# Patient Record
Sex: FEMALE | Race: WHITE | NOT HISPANIC OR LATINO | Employment: STUDENT | URBAN - METROPOLITAN AREA
[De-identification: names, ages, dates, MRNs, and addresses within clinical notes are randomized per-mention and may not be internally consistent; named-entity substitution may affect disease eponyms.]

---

## 2023-01-22 ENCOUNTER — HOSPITAL ENCOUNTER (EMERGENCY)
Facility: HOSPITAL | Age: 18
Discharge: HOME | End: 2023-01-22
Attending: EMERGENCY MEDICINE

## 2023-01-22 ENCOUNTER — APPOINTMENT (EMERGENCY)
Dept: ULTRASOUND IMAGING | Facility: HOSPITAL | Age: 18
End: 2023-01-22

## 2023-01-22 VITALS
SYSTOLIC BLOOD PRESSURE: 118 MMHG | WEIGHT: 156.53 LBS | TEMPERATURE: 97.8 F | RESPIRATION RATE: 18 BRPM | HEIGHT: 68 IN | HEART RATE: 80 BPM | BODY MASS INDEX: 23.72 KG/M2 | OXYGEN SATURATION: 97 % | DIASTOLIC BLOOD PRESSURE: 69 MMHG

## 2023-01-22 DIAGNOSIS — N93.9 VAGINAL BLEEDING: Primary | ICD-10-CM

## 2023-01-22 DIAGNOSIS — O03.9 MISCARRIAGE: ICD-10-CM

## 2023-01-22 LAB
ABO GROUP BLD: NORMAL
ABO GROUP BLD: NORMAL
ALBUMIN SERPL BCP-MCNC: 4.5 G/DL (ref 3.5–5)
ALP SERPL-CCNC: 65 U/L (ref 34–104)
ALT SERPL W P-5'-P-CCNC: 9 U/L (ref 7–52)
ANION GAP SERPL CALCULATED.3IONS-SCNC: 8 MMOL/L (ref 4–13)
APTT PPP: 27 SECONDS (ref 23–37)
AST SERPL W P-5'-P-CCNC: 13 U/L (ref 13–39)
B-HCG SERPL-ACNC: 2134 MIU/ML (ref 0–11.6)
BACTERIA UR QL AUTO: ABNORMAL /HPF
BASOPHILS # BLD AUTO: 0.05 THOUSANDS/ÂΜL (ref 0–0.1)
BASOPHILS NFR BLD AUTO: 1 % (ref 0–1)
BILIRUB SERPL-MCNC: 0.25 MG/DL (ref 0.2–1)
BILIRUB UR QL STRIP: NEGATIVE
BLD GP AB SCN SERPL QL: NEGATIVE
BUN SERPL-MCNC: 10 MG/DL (ref 5–25)
CALCIUM SERPL-MCNC: 9.4 MG/DL (ref 8.4–10.2)
CHLORIDE SERPL-SCNC: 106 MMOL/L (ref 96–108)
CLARITY UR: ABNORMAL
CO2 SERPL-SCNC: 24 MMOL/L (ref 21–32)
COLOR UR: ABNORMAL
CREAT SERPL-MCNC: 0.65 MG/DL (ref 0.6–1.3)
EOSINOPHIL # BLD AUTO: 0.13 THOUSAND/ÂΜL (ref 0–0.61)
EOSINOPHIL NFR BLD AUTO: 2 % (ref 0–6)
ERYTHROCYTE [DISTWIDTH] IN BLOOD BY AUTOMATED COUNT: 12.6 % (ref 11.6–15.1)
EXT PREGNANCY TEST URINE: POSITIVE
EXT. CONTROL: ABNORMAL
GFR SERPL CREATININE-BSD FRML MDRD: 129 ML/MIN/1.73SQ M
GLUCOSE SERPL-MCNC: 80 MG/DL (ref 65–140)
GLUCOSE UR STRIP-MCNC: NEGATIVE MG/DL
HCT VFR BLD AUTO: 34.6 % (ref 34.8–46.1)
HGB BLD-MCNC: 11.3 G/DL (ref 11.5–15.4)
HGB UR QL STRIP.AUTO: ABNORMAL
IMM GRANULOCYTES # BLD AUTO: 0.03 THOUSAND/UL (ref 0–0.2)
IMM GRANULOCYTES NFR BLD AUTO: 1 % (ref 0–2)
INR PPP: 0.98 (ref 0.84–1.19)
KETONES UR STRIP-MCNC: NEGATIVE MG/DL
LEUKOCYTE ESTERASE UR QL STRIP: NEGATIVE
LYMPHOCYTES # BLD AUTO: 1.95 THOUSANDS/ÂΜL (ref 0.6–4.47)
LYMPHOCYTES NFR BLD AUTO: 32 % (ref 14–44)
MCH RBC QN AUTO: 28.2 PG (ref 26.8–34.3)
MCHC RBC AUTO-ENTMCNC: 32.7 G/DL (ref 31.4–37.4)
MCV RBC AUTO: 86 FL (ref 82–98)
MONOCYTES # BLD AUTO: 0.68 THOUSAND/ÂΜL (ref 0.17–1.22)
MONOCYTES NFR BLD AUTO: 11 % (ref 4–12)
NEUTROPHILS # BLD AUTO: 3.28 THOUSANDS/ÂΜL (ref 1.85–7.62)
NEUTS SEG NFR BLD AUTO: 53 % (ref 43–75)
NITRITE UR QL STRIP: NEGATIVE
NON-SQ EPI CELLS URNS QL MICRO: ABNORMAL /HPF
NRBC BLD AUTO-RTO: 0 /100 WBCS
PH UR STRIP.AUTO: 5.5 [PH]
PLATELET # BLD AUTO: 239 THOUSANDS/UL (ref 149–390)
PMV BLD AUTO: 9.7 FL (ref 8.9–12.7)
POTASSIUM SERPL-SCNC: 3.8 MMOL/L (ref 3.5–5.3)
PROT SERPL-MCNC: 7.7 G/DL (ref 6.4–8.4)
PROT UR STRIP-MCNC: NEGATIVE MG/DL
PROTHROMBIN TIME: 13.2 SECONDS (ref 11.6–14.5)
RBC # BLD AUTO: 4.01 MILLION/UL (ref 3.81–5.12)
RBC #/AREA URNS AUTO: ABNORMAL /HPF
RH BLD: POSITIVE
RH BLD: POSITIVE
SODIUM SERPL-SCNC: 138 MMOL/L (ref 135–147)
SP GR UR STRIP.AUTO: 1.01 (ref 1–1.03)
SPECIMEN EXPIRATION DATE: NORMAL
UROBILINOGEN UR STRIP-ACNC: <2 MG/DL
WBC # BLD AUTO: 6.12 THOUSAND/UL (ref 4.31–10.16)
WBC #/AREA URNS AUTO: ABNORMAL /HPF

## 2023-01-22 RX ORDER — ACETAMINOPHEN 325 MG/1
975 TABLET ORAL EVERY 6 HOURS PRN
Status: DISCONTINUED | OUTPATIENT
Start: 2023-01-22 | End: 2023-01-22 | Stop reason: HOSPADM

## 2023-01-22 RX ORDER — ONDANSETRON 2 MG/ML
4 INJECTION INTRAMUSCULAR; INTRAVENOUS EVERY 4 HOURS PRN
Status: DISCONTINUED | OUTPATIENT
Start: 2023-01-22 | End: 2023-01-22 | Stop reason: HOSPADM

## 2023-01-22 NOTE — ED PROVIDER NOTES
History  Chief Complaint   Patient presents with   • Vaginal Bleeding     Started with bleeding on January ninth  Reprots heavy and bleeding and clotting since the 9th  Took positive pregnancy test today  Breanna Oswald comes to the ED after Tarsha Webster same persistent bleeding that has been gone since January 9, 2023  She states that she thought it was her period as her last reported previous period was around Thanksgiving (2022)  She states the been associated with mild abdominal discomfort in her lower quadrants as well as mild nausea without frequent episodes of emesis  She denies any fevers, chills, urinary dysfunction, urinary pain, burning sensation with urination, or passage of any vaginal discharge  She states that the bleeding has been persistent that she will soaked through multiple pads throughout the course of 24 hours and is upgraded to wearing Pampers/diapers which she states over the last 24 hours have required 3 changes secondary to being soaked with blood  She states that she has not had any previous abnormalities with her menstrual cycle in the past   She states that she has had fairly "regular cycles" with no menorrhagia or dyspareunia prior to onset of symptoms  She states that she has not had any abdominal surgeries or previous gynecological pathology that she is aware of in the past     She does describe that she has a history of "low blood counts" in which she takes iron supplementation  This information was unable to be acquired in care everywhere or review of patient's electronic medical record  Patient states that she has not felt lightheaded or as if she was going to pass out during the episodes of this persistent vaginal bleeding  During her evaluation of her symptoms this morning at home, she took a pregnancy test which came back positive    Due to the combination of the positive pregnancy test as well as near 2 weeks of persistent vaginal bleeding, she wished to come to the emergency department for continued evaluation of symptoms  History provided by:  Patient   used: No    Vaginal Bleeding  Quality:  Bright red and clots  Severity:  Moderate  Onset quality:  Gradual  Duration:  2 weeks  Timing:  Sporadic  Progression:  Unchanged  Chronicity:  New  Menstrual history:  Regular  Possible pregnancy: yes    Context: at rest    Context: not after intercourse, not after urination, not during intercourse, not during urination, not foreign body, not genital trauma and not spontaneously    Relieved by:  Nothing  Worsened by:  Nothing  Ineffective treatments:  None tried  Associated symptoms: nausea    Associated symptoms: no abdominal pain, no back pain, no dizziness, no dyspareunia, no dysuria, no fatigue, no fever and no vaginal discharge    Risk factors: no bleeding disorder        None       History reviewed  No pertinent past medical history  History reviewed  No pertinent surgical history  History reviewed  No pertinent family history  I have reviewed and agree with the history as documented  E-Cigarette/Vaping     E-Cigarette/Vaping Substances     Social History     Tobacco Use   • Smoking status: Never   • Smokeless tobacco: Never   Substance Use Topics   • Alcohol use: Not Currently   • Drug use: Never        Review of Systems   Constitutional: Negative for chills, fatigue and fever  HENT: Negative for ear pain and sore throat  Eyes: Negative for pain and visual disturbance  Respiratory: Negative for cough and shortness of breath  Cardiovascular: Negative for chest pain and palpitations  Gastrointestinal: Positive for nausea  Negative for abdominal pain and vomiting  Genitourinary: Positive for vaginal bleeding  Negative for dyspareunia, dysuria, hematuria and vaginal discharge  Musculoskeletal: Negative for arthralgias and back pain  Skin: Negative for color change and rash  Neurological: Negative for dizziness, seizures and syncope  All other systems reviewed and are negative  Physical Exam  ED Triage Vitals [01/22/23 1358]   Temperature Pulse Respirations Blood Pressure SpO2   97 8 °F (36 6 °C) 86 20 136/85 100 %      Temp Source Heart Rate Source Patient Position - Orthostatic VS BP Location FiO2 (%)   Oral Monitor Sitting Right arm --      Pain Score       No Pain             Orthostatic Vital Signs  Vitals:    01/22/23 1358 01/22/23 1845   BP: 136/85 118/69   Pulse: 86 80   Patient Position - Orthostatic VS: Sitting Sitting       Physical Exam  Vitals and nursing note reviewed  Constitutional:       General: She is not in acute distress  Appearance: Normal appearance  She is well-developed and normal weight  She is not ill-appearing or diaphoretic  HENT:      Head: Normocephalic and atraumatic  Right Ear: External ear normal       Left Ear: External ear normal       Nose: Nose normal       Mouth/Throat:      Mouth: Mucous membranes are moist       Pharynx: No oropharyngeal exudate or posterior oropharyngeal erythema  Comments: No pallor noted around the eyelids or oral mucosa  No signs consistent with anemia on this provider's examination  Eyes:      General:         Right eye: No discharge  Left eye: No discharge  Conjunctiva/sclera: Conjunctivae normal    Cardiovascular:      Rate and Rhythm: Normal rate and regular rhythm  Pulses: Normal pulses  Heart sounds: No murmur heard  Pulmonary:      Effort: Pulmonary effort is normal  No respiratory distress  Breath sounds: Normal breath sounds  Abdominal:      Palpations: Abdomen is soft  Tenderness: There is no abdominal tenderness  There is no guarding or rebound  Musculoskeletal:         General: No swelling, deformity or signs of injury  Cervical back: Neck supple  Skin:     General: Skin is warm and dry  Capillary Refill: Capillary refill takes less than 2 seconds     Neurological:      General: No focal deficit present  Mental Status: She is alert and oriented to person, place, and time     Psychiatric:         Mood and Affect: Mood normal          ED Medications  Medications - No data to display    Diagnostic Studies  Results Reviewed     Procedure Component Value Units Date/Time    Urine Microscopic [[id number]]  (Abnormal) Collected: 01/22/23 1437    Lab Status: Final result Specimen: Urine, Clean Catch Updated: 01/22/23 1513     RBC, UA Innumerable /hpf      WBC, UA None Seen /hpf      Epithelial Cells None Seen /hpf      Bacteria, UA None Seen /hpf      URINE COMMENT --    Comprehensive metabolic panel [[id number]] Collected: 01/22/23 1405    Lab Status: Final result Specimen: Blood from Arm, Left Updated: 01/22/23 1511     Sodium 138 mmol/L      Potassium 3 8 mmol/L      Chloride 106 mmol/L      CO2 24 mmol/L      ANION GAP 8 mmol/L      BUN 10 mg/dL      Creatinine 0 65 mg/dL      Glucose 80 mg/dL      Calcium 9 4 mg/dL      AST 13 U/L      ALT 9 U/L      Alkaline Phosphatase 65 U/L      Total Protein 7 7 g/dL      Albumin 4 5 g/dL      Total Bilirubin 0 25 mg/dL      eGFR 129 ml/min/1 73sq m     Narrative:      [place] guidelines for Chronic Kidney Disease (CKD):   •  Stage 1 with normal or high GFR (GFR > 90 mL/min/1 73 square meters)  •  Stage 2 Mild CKD (GFR = 60-89 mL/min/1 73 square meters)  •  Stage 3A Moderate CKD (GFR = 45-59 mL/min/1 73 square meters)  •  Stage 3B Moderate CKD (GFR = 30-44 mL/min/1 73 square meters)  •  Stage 4 Severe CKD (GFR = 15-29 mL/min/1 73 square meters)  •  Stage 5 End Stage CKD (GFR <15 mL/min/1 73 square meters)  Note: GFR calculation is accurate only with a steady state creatinine    hCG, quantitative [[id number]]  (Abnormal) Collected: 01/22/23 1405    Lab Status: Final result Specimen: Blood from Arm, Left Updated: 01/22/23 1511     HCG, Quant 2,134 mIU/mL     Narrative:       Expected Ranges:     Approximate               Approximate HCG  Gestation age          Concentration ( mIU/mL)  _____________          ______________________   Emily Lack                      HCG values  0 2-1                       5-50  1-2                           2-3                         100-5000  3-4                         500-83121  4-5                         1000-71240  5-6                         39176-425135  6-8                         12371-266864  8-12                        08099-509399      UA w Reflex to Microscopic w Reflex to Culture [[id number]]  (Abnormal) Collected: 01/22/23 1437    Lab Status: Final result Specimen: Urine, Clean Catch Updated: 01/22/23 1445     Color, UA Light Yellow     Clarity, UA Turbid     Specific [place], UA 1 014     pH, UA 5 5     Leukocytes, UA Negative     Nitrite, UA Negative     Protein, UA Negative mg/dl      Glucose, UA Negative mg/dl      Ketones, UA Negative mg/dl      Urobilinogen, UA <2 0 mg/dl      Bilirubin, UA Negative     Occult Blood, UA Large     URINE COMMENT --    Urine culture [[id number]] Collected: 01/22/23 1437    Lab Status:  In process Specimen: Urine, Clean Catch Updated: 01/22/23 1445    POCT pregnancy, urine [[id number]]  (Abnormal) Resulted: 01/22/23 1437    Lab Status: Final result Updated: 01/22/23 1437     EXT Preg Test, Ur Positive     Control Valid    Protime-INR [[id number]]  (Normal) Collected: 01/22/23 1405    Lab Status: Final result Specimen: Blood from Arm, Left Updated: 01/22/23 1424     Protime 13 2 seconds      INR 0 98    APTT [[id number]]  (Normal) Collected: 01/22/23 1405    Lab Status: Final result Specimen: Blood from Arm, Left Updated: 01/22/23 1424     PTT 27 seconds     CBC and differential [[id number]]  (Abnormal) Collected: 01/22/23 1405    Lab Status: Final result Specimen: Blood from Arm, Left Updated: 01/22/23 1413     WBC 6 12 Thousand/uL      RBC 4 01 Million/uL      Hemoglobin 11 3 g/dL      Hematocrit 34 6 %      MCV 86 fL      MCH 28 2 pg      MCHC 32 7 g/dL      RDW 12 6 %      MPV 9 7 fL      Platelets 001 Thousands/uL      nRBC 0 /100 WBCs      Neutrophils Relative 53 %      Immat GRANS % 1 %      Lymphocytes Relative 32 %      Monocytes Relative 11 %      Eosinophils Relative 2 %      Basophils Relative 1 %      Neutrophils Absolute 3 28 Thousands/µL      Immature Grans Absolute 0 03 Thousand/uL      Lymphocytes Absolute 1 95 Thousands/µL      Monocytes Absolute 0 68 Thousand/µL      Eosinophils Absolute 0 13 Thousand/µL      Basophils Absolute 0 05 Thousands/µL                  US OB pregnancy limited with transvaginal   ED Interpretation by Kristian Talavera MD ([id number])   FINDINGS:     UTERUS:  The uterus is anteverted in position, measuring 8 5 x 4 4 x 6 7 cm  Contour and echotexture appear normal   The cervix is closed and within normal limits      ENDOMETRIUM:    Endometrium is heterogeneous in appearance measuring 9 mm  No evidence of intrauterine gestation      OVARIES/ADNEXA:  No adnexal mass evident  There is no free fluid      Right ovary:  3 x 2 2 x 2 8 cm  Doppler flow present  No suspicious abnormality      Left ovary:  1 6 x 1 7 x 1 4 cm  Doppler flow present  No suspicious abnormality      IMPRESSION:  Heterogeneous thickened appearance of the endometrium  No evident Intrauterine gestation or adnexal mass identified  Differential remains early IUP, spontaneous  and ectopic pregnancy  Correlate with serial quantitative BHCG           Workstation performed: [id number]      Final Result by Charlie Casper MD ([id number])   Heterogeneous thickened appearance of the endometrium  No evident Intrauterine gestation or adnexal mass identified  Differential remains early IUP, spontaneous  and ectopic pregnancy  Correlate with serial quantitative BHCG              Workstation performed: [id number]               Procedures  Procedures      ED Course  ED Course as of 23 2235   Ty Grumbling 2023   1601 OB/GYN made aware of the patient's clinical course  Ultrasonography and lab studies have been collected  Patient will be evaluated by OB/GYN once they are able to come to see the patient at the bedside   Anticipated reassessment time of approximately 7:30 PM by OB/GYN team to assess if the patient would need to be admitted versus outpatient follow-up  Medical Decision Making  Lavelle Rudolph comes the emergency department after experiencing persistent vaginal bleeding for 2 weeks  Patient had a positive pregnancy test today  Patient denies any abdominal discomfort or foul discharge but wished to come to emergency department due to the persistence of her symptoms  DDx including but not limited to: Placenta previa, placental abruption, threatened , missed , inevitable ,  labor, labor, false labor, anemia, UTI  Based on the presence of positive pregnancy test as well as vaginal bleeding, patient had laboratory evaluation conducted as well as ultrasonography  Lab evaluation was notable for hemoglobin of 11  Patient had no signs or symptoms consistent with a increased anemia or pallor  Ultrasonography was notable for the presence of increased endometrial tissue however there was no presence of intrauterine gestation  Based off of the presence of the bleeding, patient was evaluated with a type and screen and OB/GYN came to evaluate the patient at the bedside  Based off of their examination, they were able to evacuate clot from the cervix and was noted that there was a mild amount of bleeding    Based on the patient's history, physical exam findings on speculum examination, it was noted that the patient most likely had a miscarriage and they would reassess in 2 hours to see if the patient was having persistent bleeding that would require continued observation in the hospital   Upon reassessment at 2 hours, they note that the patient was reasonable for discharge home with close outpatient follow-up  Patient was agreeable with this plan  Strict return precautions were discussed at the bedside  Patient was scheduled for repeat hCG value in the outpatient setting for continued downtrending of laboratory values  Miscarriage: acute illness or injury     Details: Patient has signs and symptoms consistent with a miscarriage  Patient evaluated by OB/GYN in the emergency department  Elevated quantitative hCG  Ultrasonography notable for the lack of presence of intrauterine gestation but no signs or symptoms consistent with an ectopic pregnancy  Patient to have continual trending of hCG values as well as close follow-up with OB/GYN in the outpatient setting  Vaginal bleeding: acute illness or injury     Details: Has vaginal bleeding ongoing for 2 weeks with a positive pregnancy test   Patient evaluated with ultrasonography in the emergency department  Patient evaluated by OB/GYN  Counseled on close follow-up with OB/GYN providers as repeat laboratory findings in the outpatient therapy for continued evaluation of symptoms  Amount and/or Complexity of Data Reviewed  Labs: ordered  Details: Laboratory evaluation was notable for hemoglobin level at 11  Remainder of laboratory study was unremarkable  Urine pregnancy test was positive  Radiology: ordered       Details: Ultrasonography of the patient was conducted due to the presence of a positive pregnancy test             Disposition  Final diagnoses:   Vaginal bleeding   Miscarriage     Time reflects when diagnosis was documented in both MDM as applicable and the Disposition within this note     Time User Action Codes Description Comment    1/22/2023  3:07 PM Talhaer Vin Add [N93 9] Vaginal bleeding     1/22/2023  8:05 PM Birder Vin Add [O03 9] Miscarriage       ED Disposition     ED Disposition   Discharge    Condition   Stable    Date/Time   Sun Jan 22, 2023  8:06 PM    Comment   Latanya Hull discharge to home/self care  Follow-up Information     Follow up With Specialties Details Why Contact Info Additional Information    [facility] Emergency Medicine  As needed, If symptoms worsen [address] [facility] [address] [facility] Emergency Department, [address], [place] [facility] [address], [address] [facility], [address] [facility] Obstetrics and Gynecology Schedule an appointment as soon as possible for a visit  For continued evaluation of symptoms [address] [facility] [address]  [address] [facility], [address], [address] [phone]          There are no discharge medications for this patient  Outpatient Discharge Orders   hCG, quantitative   Standing Status: Future Standing Exp  Date: 01/22/24       PDMP Review     None           ED Provider  Attending physically available and evaluated Marlee Esparza I managed the patient along with the ED Attending      Electronically Signed by         Johann Johnson MD  01/22/23 6701

## 2023-01-22 NOTE — ED ATTENDING ATTESTATION
1/22/2023  I, Adeel Laureano MD, saw and evaluated the patient  I have discussed the patient with the resident/non-physician practitioner and agree with the resident's/non-physician practitioner's findings, Plan of Care, and MDM as documented in the resident's/non-physician practitioner's note, except where noted  All available labs and Radiology studies were reviewed  I was present for key portions of any procedure(s) performed by the resident/non-physician practitioner and I was immediately available to provide assistance  At this point I agree with the current assessment done in the Emergency Department  I have conducted an independent evaluation of this patient a history and physical is as follows:    10-year-old female with reported history of iron deficiency anemia presents for evaluation of heavy vaginal bleeding over the last 2 weeks with positive pregnancy test today  Her last normal menstrual period was approximately 9 weeks ago  States she did have some spotting in between  Typically has very light periods and her bleeding the last 2 weeks has been particularly heavy, needing to use extra pads and actually diapers in order to prevent leakage  She denies any lightheadedness, dizziness, weakness, fever, chills, nausea, vomiting, urinary symptoms  Vitals normal here  No acute distress  Plan labs including serum hCG, type and screen, CBC, UA, pelvic ultrasound  Differential diagnosis includes threatened miscarriage, ectopic pregnancy, miscarriage  ED Course  ED Course as of 01/22/23 2315   Sun Jan 22, 2023   1506 PREGNANCY TEST URINE(!): Positive   1506 Blood, UA(!): Large   1506 Hemoglobin(!): 11 3  Unclear baseline hemoglobin  No prior records immediately available  1603 HCG QUANTITATIVE(!): 2,134   1604 Pelvic US equivocal:    "Heterogeneous thickened appearance of the endometrium  No evident Intrauterine gestation or adnexal mass identified     Differential remains early IUP, spontaneous  and ectopic pregnancy   Correlate with serial quantitative BHCG "   1546 Patient evaluated by OB/GYN team   They reported removing small clot from cervical os  They recommended continued observation for the next 2 hours to assess for any ongoing significant hemorrhage  Suspect based on history and exam/ultrasound findings that this is most likely complete miscarriage  After re-evaluation, patient stable for discharge home  Bleeding controlled  Plan outpatient OB/GYN follow up      Critical Care Time  Procedures

## 2023-01-23 NOTE — DISCHARGE INSTRUCTIONS
Return to the Emergency Department sooner if increased pain, fever, vomiting, diarrhea, difficulty breathing or urinating, bleeding  Clear fluids for 1-2 days and then advance diet as tolerated  Please follow-up with OB/GYN providers as soon as possible for continued evaluation of symptoms

## 2023-01-23 NOTE — CONSULTS
Consult - OB/GYN   Lucy Mclean 21 y o  female MRN: [id number]  Unit/Bed#: ED-28 Encounter: [id number]    Assessment:   21 y o   who presents with likely sponatenous  given heavy bleeding with passage of clots and tissue at home and evidence of no IUP or ectopic on TVUS  Vitals stable, hemoglobin 11 2  Bleeding remained stable 2-3 hours after pelvic exam, no indication for Cytotec at this time  Plan:   Rh positive, Rhogam not indicated   Patient will follow up with her ObGyn in [place] this coming Friday   Lab slip for beta hCG given to be drawn in 48 hours to ensure levels are coming down appropiately   Return precautions given if saturating more than 1 pad an hour for 2 hours  Can discuss contraception with her ObGyn outpatient    Discussed case and plan w/ Dr Gabby Jade    HPI:  Lam Rodrigez is a 20 yo  presenting with positive pregnancy test and heavy vaginal bleeding  LMP 22  She has been spotting over the past couple of weeks and started with heavier bleeding last night into this morning that has continued  She passed large clots and possible some pregnancy tissue  PMH unremarkable  PMH:  History reviewed  No pertinent past medical history  PSH:  History reviewed  No pertinent surgical history  OB History  OB History    Para Term  AB Living   1             SAB IAB Ectopic Multiple Live Births                  # Outcome Date GA Lbr Albino/2nd Weight Sex Delivery Anes PTL Lv   1 Current                  Meds:  No current facility-administered medications on file prior to encounter  No current outpatient medications on file prior to encounter  Allergies:  No Known Allergies    Physical Exam:  /69 (BP Location: Right arm)   Pulse 80   Temp 97 8 °F (36 6 °C) (Oral)   Resp 18   Ht 5' 8" (1 727 m)   Wt 71 kg (156 lb 8 4 oz)   LMP 2022   SpO2 97%   BMI 23 80 kg/m²     Physical Exam  Constitutional:       General: She is not in acute distress  Appearance: She is not ill-appearing or toxic-appearing  HENT:      Head: Normocephalic and atraumatic  Pulmonary:      Effort: Pulmonary effort is normal  No respiratory distress  Breath sounds: No stridor  Abdominal:      Palpations: Abdomen is soft  Tenderness: There is no abdominal tenderness  Genitourinary:     Comments: Speculum revealed pooling of bright red blood and clots filling the vagina  Clots and blood removed via suction and cervix was then easily visualized  There was no further active bleeding and os appeared minimally dilated  Bimanual attempted but cervix unable to be palpated due to significant patient discomfort  Skin:     General: Skin is warm and dry  Neurological:      General: No focal deficit present  Mental Status: She is alert and oriented to person, place, and time  Psychiatric:         Mood and Affect: Mood normal          Thought Content:  Thought content normal          Judgment: Judgment normal

## 2023-01-24 ENCOUNTER — APPOINTMENT (OUTPATIENT)
Dept: LAB | Facility: CLINIC | Age: 18
End: 2023-01-24

## 2023-01-24 DIAGNOSIS — N93.9 VAGINAL BLEEDING: ICD-10-CM

## 2023-01-24 LAB
B-HCG SERPL-ACNC: 1777 MIU/ML (ref 0–11.6)
BACTERIA UR CULT: NORMAL

## 2023-01-27 ENCOUNTER — OFFICE VISIT (OUTPATIENT)
Dept: OBGYN CLINIC | Facility: CLINIC | Age: 18
End: 2023-01-27

## 2023-01-27 VITALS
BODY MASS INDEX: 23.13 KG/M2 | DIASTOLIC BLOOD PRESSURE: 82 MMHG | WEIGHT: 152.6 LBS | HEIGHT: 68 IN | SYSTOLIC BLOOD PRESSURE: 120 MMHG

## 2023-01-27 DIAGNOSIS — O03.9 COMPLETE SPONTANEOUS ABORTION: Primary | ICD-10-CM

## 2023-01-27 DIAGNOSIS — Z11.3 ROUTINE SCREENING FOR STI (SEXUALLY TRANSMITTED INFECTION): ICD-10-CM

## 2023-01-27 DIAGNOSIS — Z30.09 ENCOUNTER FOR COUNSELING REGARDING CONTRACEPTION: ICD-10-CM

## 2023-01-27 NOTE — PROGRESS NOTES
Assessment/Plan:  - Empty uterus on TVUS  - Quants decreasing from  -   - Serial quants ordered to be completed weekly until 0  - Reviewed expected bleeding and timing of next normal period  - Will complete urine STI screening at lab next week  - Blood type A pos, rhogam not indicated  - Will reach out to patient with results  - Patient to call with any concerns       Diagnoses and all orders for this visit:    Complete spontaneous   -     hCG, quantitative; Standing  -     hCG, quantitative    Routine screening for STI (sexually transmitted infection)  -     Chlamydia/GC amplified DNA by PCR; Future    Encounter for counseling regarding contraception  Comments:  rec Minor Hams IUD in future if pt desires contraception          Subjective:      Patient ID: Jeffrey Saxena is a 19 y o  female  Pam Quick is a [facility] presenting to the office as a new patient for possible miscarriage followup  Patient was seen in the ER for vaginal bleeding in early pregnancy on 23  She had been having heavy bleeding with clots, but had a positive pregnancy test  Her LMP was 22, which would place her at 9w1d today  Patient had an HCG in the ER which was 2,134 on 23  She also had an empty uterus on ultrasound in the ER  She repeated her HCG on  and it had decreased to 1,777  Patient is here today for follow up  She is still having bleeding, but states it is lighter than a normal period  She states this was an unplanned pregnancy  She does not have a significant other  She is not currently on birth control and usually uses condoms for contraception  She would prefer to remain off of birth control, but will consider her options  She is currently on clinicals for nursing school at [employer]  The following portions of the patient's history were reviewed and updated as appropriate:   She  has no past medical history on file  She There are no problems to display for this patient      She has no past surgical history on file  Her family history is not on file  She  reports that she has never smoked  She has never used smokeless tobacco  She reports that she does not currently use alcohol  She reports that she does not use drugs  No current outpatient medications on file  No current facility-administered medications for this visit       Review of Systems   Constitutional: Negative for chills, fever and unexpected weight change  Respiratory: Negative for shortness of breath  Cardiovascular: Negative for chest pain  Gastrointestinal: Negative for abdominal pain and vomiting  Genitourinary: Positive for vaginal bleeding  Skin: Negative for rash  Objective:      /82 (BP Location: Left arm, Patient Position: Sitting, Cuff Size: Standard)   Ht 5' 8" (1 727 m)   Wt 69 2 kg (152 lb 9 6 oz)   BMI 23 20 kg/m²          Physical Exam  Vitals reviewed  Constitutional:       Appearance: Normal appearance  She is normal weight  HENT:      Head: Normocephalic and atraumatic  Pulmonary:      Effort: Pulmonary effort is normal    Genitourinary:     General: Normal vulva  Labia:         Right: No rash or lesion  Left: No rash or lesion  Vagina: Bleeding present  Comments: TVUS reveals empty uterus  Skin:     General: Skin is warm and dry  Neurological:      General: No focal deficit present  Mental Status: She is alert  Psychiatric:         Mood and Affect: Mood normal          Behavior: Behavior normal        Ultrasound Probe Disinfection    A transvaginal ultrasound was performed     Prior to use, disinfection was performed with High Level Disinfection Process (Trophon)  Probe serial number [id number]: [id number] was used    Bibiana Aceves PA-C  01/27/23  12:57 PM

## 2023-01-31 ENCOUNTER — APPOINTMENT (OUTPATIENT)
Dept: LAB | Facility: AMBULARY SURGERY CENTER | Age: 18
End: 2023-01-31

## 2023-01-31 DIAGNOSIS — Z11.3 ROUTINE SCREENING FOR STI (SEXUALLY TRANSMITTED INFECTION): ICD-10-CM

## 2023-01-31 LAB
B-HCG SERPL-ACNC: 445 MIU/ML
C TRACH DNA SPEC QL NAA+PROBE: NEGATIVE
N GONORRHOEA DNA SPEC QL NAA+PROBE: NEGATIVE

## 2023-02-07 ENCOUNTER — APPOINTMENT (OUTPATIENT)
Dept: LAB | Facility: AMBULARY SURGERY CENTER | Age: 18
End: 2023-02-07

## 2023-02-21 ENCOUNTER — APPOINTMENT (OUTPATIENT)
Dept: LAB | Facility: AMBULARY SURGERY CENTER | Age: 18
End: 2023-02-21